# Patient Record
Sex: MALE | HISPANIC OR LATINO | URBAN - METROPOLITAN AREA
[De-identification: names, ages, dates, MRNs, and addresses within clinical notes are randomized per-mention and may not be internally consistent; named-entity substitution may affect disease eponyms.]

---

## 2025-07-04 ENCOUNTER — OFFICE VISIT (OUTPATIENT)
Dept: URGENT CARE | Age: 30
End: 2025-07-04
Payer: COMMERCIAL

## 2025-07-04 VITALS
OXYGEN SATURATION: 96 % | HEART RATE: 74 BPM | TEMPERATURE: 98.1 F | DIASTOLIC BLOOD PRESSURE: 72 MMHG | SYSTOLIC BLOOD PRESSURE: 107 MMHG | RESPIRATION RATE: 16 BRPM

## 2025-07-04 DIAGNOSIS — R30.0 DYSURIA: ICD-10-CM

## 2025-07-04 LAB
POC APPEARANCE, URINE: ABNORMAL
POC BILIRUBIN, URINE: NEGATIVE
POC BLOOD, URINE: NEGATIVE
POC COLOR, URINE: YELLOW
POC GLUCOSE, URINE: NEGATIVE MG/DL
POC KETONES, URINE: NEGATIVE MG/DL
POC LEUKOCYTES, URINE: NEGATIVE
POC NITRITE,URINE: NEGATIVE
POC PH, URINE: 6 PH
POC PROTEIN, URINE: NEGATIVE MG/DL
POC SPECIFIC GRAVITY, URINE: 1.01
POC UROBILINOGEN, URINE: 0.2 EU/DL

## 2025-07-04 PROCEDURE — 99203 OFFICE O/P NEW LOW 30 MIN: CPT | Performed by: PHYSICIAN ASSISTANT

## 2025-07-04 PROCEDURE — 1036F TOBACCO NON-USER: CPT | Performed by: PHYSICIAN ASSISTANT

## 2025-07-04 PROCEDURE — 81003 URINALYSIS AUTO W/O SCOPE: CPT | Performed by: PHYSICIAN ASSISTANT

## 2025-07-04 RX ORDER — DOXYCYCLINE HYCLATE 100 MG
100 TABLET ORAL 2 TIMES DAILY
Qty: 20 TABLET | Refills: 0 | Status: SHIPPED | OUTPATIENT
Start: 2025-07-04 | End: 2025-07-14

## 2025-07-04 ASSESSMENT — PATIENT HEALTH QUESTIONNAIRE - PHQ9
SUM OF ALL RESPONSES TO PHQ9 QUESTIONS 1 AND 2: 0
1. LITTLE INTEREST OR PLEASURE IN DOING THINGS: NOT AT ALL
2. FEELING DOWN, DEPRESSED OR HOPELESS: NOT AT ALL

## 2025-07-04 NOTE — PROGRESS NOTES
Subjective   Patient ID: Jb Guzman is a 29 y.o. male. They present today with a chief complaint of Other (Patient states warm feeling after urination x 2-3 days ).    History of Present Illness  HPI  He wants screened for std's. He has no sxs, but a partner may have said they have chlamydia.  He would like tested.  No burning or increased frequency.   No fever or chills, feels well overall.  No uti sxs.   Past Medical History  Allergies as of 07/04/2025    (No Known Allergies)       Prescriptions Prior to Admission[1]     Medical History[2]    Surgical History[3]     reports that he has never smoked. He has never used smokeless tobacco. He reports that he does not use drugs.    Review of Systems  Review of Systems   Genitourinary: Negative.    All other systems reviewed and are negative.                                 Objective    Vitals:    07/04/25 1210   BP: 107/72   Pulse: 74   Resp: 16   Temp: 36.7 °C (98.1 °F)   SpO2: 96%     No LMP for male patient.    Physical Exam  Vitals reviewed.   Constitutional:       Appearance: Normal appearance.   Genitourinary:     Comments: Declines penis and testes exam    Neurological:      Mental Status: He is alert.         Procedures    Point of Care Test & Imaging Results from this visit  Results for orders placed or performed in visit on 07/04/25   POCT UA Automated manually resulted   Result Value Ref Range    POC Color, Urine Yellow Straw, Yellow, Light-Yellow    POC Appearance, Urine Cloudy (A) Clear    POC Glucose, Urine NEGATIVE NEGATIVE mg/dl    POC Bilirubin, Urine NEGATIVE NEGATIVE    POC Ketones, Urine NEGATIVE NEGATIVE mg/dl    POC Specific Gravity, Urine 1.015 1.005 - 1.035    POC Blood, Urine NEGATIVE NEGATIVE    POC PH, Urine 6.0 No Reference Range Established PH    POC Protein, Urine NEGATIVE NEGATIVE mg/dl    POC Urobilinogen, Urine 0.2 0.2, 1.0 EU/DL    Poc Nitrite, Urine NEGATIVE NEGATIVE    POC Leukocytes, Urine NEGATIVE NEGATIVE      Imaging  No  results found.    Cardiology, Vascular, and Other Imaging  No other imaging results found for the past 2 days      Diagnostic study results (if any) were reviewed by Derek Buitrago PA-C.    Assessment/Plan   Allergies, medications, history, and pertinent labs/EKGs/Imaging reviewed by Derek Buitrago PA-C.     Medical Decision Making  Jb is here for std screen.  He has no sxs.  I sent in doxycycline. He will only start if if sxs develop, otherwise he will wait for the culture results.  He will return to  if needed.   Always practice safe sex. He will sign up for my chart to view resutls. We will call him with positive results.     Orders and Diagnoses  Diagnoses and all orders for this visit:  Dysuria  -     POCT UA Automated manually resulted  -     doxycycline (Vibra-Tabs) 100 mg tablet; Take 1 tablet (100 mg) by mouth 2 times a day for 10 days. Take with a full glass of water and do not lie down for at least 30 minutes after.  -     POCT trichomonas manually resulted  -     C. trachomatis / N. gonorrhoeae, Amplified, Urogenital      Medical Admin Record      Patient disposition: Home    Electronically signed by Derek Buitrago PA-C  12:49 PM           [1] (Not in a hospital admission)  [2] No past medical history on file.  [3]   Past Surgical History:  Procedure Laterality Date    OTHER SURGICAL HISTORY  11/02/2020    Appendectomy

## 2025-07-04 NOTE — PATIENT INSTRUCTIONS
Jb NEIL will send off culture for STD screening.   If symptoms develop over the weekend you can start the doxycycline. If you do not have symptoms, please wait for the culture results.  They should be back in 3 days.  Please always practice safe sex.   Use condoms.  Follow-up if you have additional concerns.

## 2025-07-05 LAB
C TRACH RRNA SPEC QL NAA+PROBE: DETECTED
N GONORRHOEA RRNA SPEC QL NAA+PROBE: NOT DETECTED
QUEST GC CT AMPLIFIED (ALWAYS MESSAGE): ABNORMAL
T VAGINALIS RRNA SPEC QL NAA+PROBE: NOT DETECTED